# Patient Record
Sex: MALE | Race: WHITE | Employment: FULL TIME | URBAN - METROPOLITAN AREA
[De-identification: names, ages, dates, MRNs, and addresses within clinical notes are randomized per-mention and may not be internally consistent; named-entity substitution may affect disease eponyms.]

---

## 2019-01-14 PROBLEM — N13.2 HYDRONEPHROSIS WITH URINARY OBSTRUCTION DUE TO RENAL CALCULUS: Status: ACTIVE | Noted: 2019-01-14

## 2019-01-24 PROBLEM — R10.9 LEFT FLANK PAIN: Status: ACTIVE | Noted: 2019-01-24

## 2019-03-05 RX ORDER — METFORMIN HYDROCHLORIDE 500 MG/1
TABLET ORAL 2 TIMES DAILY WITH MEALS
COMMUNITY

## 2019-03-05 RX ORDER — ATORVASTATIN CALCIUM 20 MG/1
TABLET, FILM COATED ORAL DAILY
COMMUNITY

## 2019-03-05 RX ORDER — DEXTROAMPHETAMINE SACCHARATE, AMPHETAMINE ASPARTATE, DEXTROAMPHETAMINE SULFATE AND AMPHETAMINE SULFATE 5; 5; 5; 5 MG/1; MG/1; MG/1; MG/1
20 TABLET ORAL 2 TIMES DAILY
COMMUNITY

## 2019-03-05 RX ORDER — MODAFINIL 200 MG/1
200 TABLET ORAL DAILY
COMMUNITY

## 2019-03-11 ENCOUNTER — ANESTHESIA EVENT (OUTPATIENT)
Dept: SURGERY | Age: 59
DRG: 661 | End: 2019-03-11
Payer: OTHER GOVERNMENT

## 2019-03-11 RX ORDER — CEFAZOLIN SODIUM 2 G/50ML
2 SOLUTION INTRAVENOUS ONCE
Status: CANCELLED | OUTPATIENT
Start: 2019-03-12 | End: 2019-03-12

## 2019-03-12 ENCOUNTER — APPOINTMENT (OUTPATIENT)
Dept: GENERAL RADIOLOGY | Age: 59
DRG: 661 | End: 2019-03-12
Attending: UROLOGY
Payer: OTHER GOVERNMENT

## 2019-03-12 ENCOUNTER — HOSPITAL ENCOUNTER (INPATIENT)
Age: 59
LOS: 1 days | Discharge: HOME | DRG: 661 | End: 2019-03-13
Attending: UROLOGY | Admitting: UROLOGY
Payer: OTHER GOVERNMENT

## 2019-03-12 ENCOUNTER — ANESTHESIA (OUTPATIENT)
Dept: SURGERY | Age: 59
DRG: 661 | End: 2019-03-12
Payer: OTHER GOVERNMENT

## 2019-03-12 DIAGNOSIS — N20.0 CALCULUS OF LEFT KIDNEY: ICD-10-CM

## 2019-03-12 DIAGNOSIS — N13.2 HYDRONEPHROSIS WITH URINARY OBSTRUCTION DUE TO RENAL CALCULUS: ICD-10-CM

## 2019-03-12 DIAGNOSIS — R10.9 LEFT FLANK PAIN: Primary | ICD-10-CM

## 2019-03-12 LAB
ANION GAP SERPL CALC-SCNC: 6 MMOL/L (ref 3–18)
BUN SERPL-MCNC: 15 MG/DL (ref 7–18)
BUN/CREAT SERPL: 12 (ref 12–20)
CALCIUM SERPL-MCNC: 8.8 MG/DL (ref 8.5–10.1)
CHLORIDE SERPL-SCNC: 107 MMOL/L (ref 100–108)
CO2 SERPL-SCNC: 27 MMOL/L (ref 21–32)
CREAT SERPL-MCNC: 1.24 MG/DL (ref 0.6–1.3)
ERYTHROCYTE [DISTWIDTH] IN BLOOD BY AUTOMATED COUNT: 12.5 % (ref 11.6–14.5)
GLUCOSE BLD STRIP.AUTO-MCNC: 109 MG/DL (ref 70–110)
GLUCOSE BLD STRIP.AUTO-MCNC: 97 MG/DL (ref 70–110)
GLUCOSE SERPL-MCNC: 122 MG/DL (ref 74–99)
HCT VFR BLD AUTO: 47.5 % (ref 36–48)
HGB BLD-MCNC: 15.5 G/DL (ref 13–16)
INR BLD: 1 (ref 0.9–1.2)
MCH RBC QN AUTO: 30.4 PG (ref 24–34)
MCHC RBC AUTO-ENTMCNC: 32.6 G/DL (ref 31–37)
MCV RBC AUTO: 93.1 FL (ref 74–97)
PLATELET # BLD AUTO: 277 K/UL (ref 135–420)
PMV BLD AUTO: 10.9 FL (ref 9.2–11.8)
POTASSIUM SERPL-SCNC: 4.2 MMOL/L (ref 3.5–5.5)
RBC # BLD AUTO: 5.1 M/UL (ref 4.7–5.5)
SODIUM SERPL-SCNC: 140 MMOL/L (ref 136–145)
WBC # BLD AUTO: 9 K/UL (ref 4.6–13.2)

## 2019-03-12 PROCEDURE — 77010033678 HC OXYGEN DAILY: Performed by: UROLOGY

## 2019-03-12 PROCEDURE — 77030013079 HC BLNKT BAIR HGGR 3M -A: Performed by: NURSE ANESTHETIST, CERTIFIED REGISTERED

## 2019-03-12 PROCEDURE — 74011250636 HC RX REV CODE- 250/636: Performed by: NURSE ANESTHETIST, CERTIFIED REGISTERED

## 2019-03-12 PROCEDURE — 74011250636 HC RX REV CODE- 250/636

## 2019-03-12 PROCEDURE — 77030038846 HC SCPE URETSCP LITHOVUE DISP BSC -H: Performed by: UROLOGY

## 2019-03-12 PROCEDURE — 77030002933 HC SUT MCRYL J&J -A: Performed by: UROLOGY

## 2019-03-12 PROCEDURE — 77030032490 HC SLV COMPR SCD KNE COVD -B: Performed by: UROLOGY

## 2019-03-12 PROCEDURE — 74011250637 HC RX REV CODE- 250/637: Performed by: UROLOGY

## 2019-03-12 PROCEDURE — 77030020782 HC GWN BAIR PAWS FLX 3M -B: Performed by: UROLOGY

## 2019-03-12 PROCEDURE — C1769 GUIDE WIRE: HCPCS | Performed by: UROLOGY

## 2019-03-12 PROCEDURE — C1726 CATH, BAL DIL, NON-VASCULAR: HCPCS | Performed by: UROLOGY

## 2019-03-12 PROCEDURE — 74011000250 HC RX REV CODE- 250: Performed by: UROLOGY

## 2019-03-12 PROCEDURE — 0T778DZ DILATION OF LEFT URETER WITH INTRALUMINAL DEVICE, VIA NATURAL OR ARTIFICIAL OPENING ENDOSCOPIC: ICD-10-PCS | Performed by: UROLOGY

## 2019-03-12 PROCEDURE — 77030005518 HC CATH URETH FOL 2W BARD -B: Performed by: UROLOGY

## 2019-03-12 PROCEDURE — C1894 INTRO/SHEATH, NON-LASER: HCPCS | Performed by: UROLOGY

## 2019-03-12 PROCEDURE — 85610 PROTHROMBIN TIME: CPT

## 2019-03-12 PROCEDURE — 77030018836 HC SOL IRR NACL ICUM -A: Performed by: UROLOGY

## 2019-03-12 PROCEDURE — 82360 CALCULUS ASSAY QUANT: CPT

## 2019-03-12 PROCEDURE — 0TC43ZZ EXTIRPATION OF MATTER FROM LEFT KIDNEY PELVIS, PERCUTANEOUS APPROACH: ICD-10-PCS | Performed by: UROLOGY

## 2019-03-12 PROCEDURE — 74011636320 HC RX REV CODE- 636/320: Performed by: UROLOGY

## 2019-03-12 PROCEDURE — 77030026438 HC STYL ET INTUB CARD -A: Performed by: NURSE ANESTHETIST, CERTIFIED REGISTERED

## 2019-03-12 PROCEDURE — 77030019605: Performed by: UROLOGY

## 2019-03-12 PROCEDURE — C1758 CATHETER, URETERAL: HCPCS | Performed by: UROLOGY

## 2019-03-12 PROCEDURE — C2617 STENT, NON-COR, TEM W/O DEL: HCPCS | Performed by: UROLOGY

## 2019-03-12 PROCEDURE — 77030031139 HC SUT VCRL2 J&J -A: Performed by: UROLOGY

## 2019-03-12 PROCEDURE — BT1F1ZZ FLUOROSCOPY OF LEFT KIDNEY, URETER AND BLADDER USING LOW OSMOLAR CONTRAST: ICD-10-PCS | Performed by: UROLOGY

## 2019-03-12 PROCEDURE — 77030020268 HC MISC GENERAL SUPPLY: Performed by: UROLOGY

## 2019-03-12 PROCEDURE — 74011000250 HC RX REV CODE- 250

## 2019-03-12 PROCEDURE — 77030012961 HC IRR KT CYSTO/TUR ICUM -A: Performed by: UROLOGY

## 2019-03-12 PROCEDURE — 77030034850: Performed by: UROLOGY

## 2019-03-12 PROCEDURE — 74011250636 HC RX REV CODE- 250/636: Performed by: UROLOGY

## 2019-03-12 PROCEDURE — 76210000016 HC OR PH I REC 1 TO 1.5 HR: Performed by: UROLOGY

## 2019-03-12 PROCEDURE — 85027 COMPLETE CBC AUTOMATED: CPT

## 2019-03-12 PROCEDURE — 77030008683 HC TU ET CUF COVD -A: Performed by: NURSE ANESTHETIST, CERTIFIED REGISTERED

## 2019-03-12 PROCEDURE — 76060000035 HC ANESTHESIA 2 TO 2.5 HR: Performed by: UROLOGY

## 2019-03-12 PROCEDURE — C1785 PMKR, DUAL, RATE-RESP: HCPCS | Performed by: UROLOGY

## 2019-03-12 PROCEDURE — 74425 UROGRAPHY ANTEGRADE RS&I: CPT

## 2019-03-12 PROCEDURE — 80048 BASIC METABOLIC PNL TOTAL CA: CPT

## 2019-03-12 PROCEDURE — 77030005206: Performed by: UROLOGY

## 2019-03-12 PROCEDURE — 99218 HC RM OBSERVATION: CPT

## 2019-03-12 PROCEDURE — 74011000250 HC RX REV CODE- 250: Performed by: NURSE ANESTHETIST, CERTIFIED REGISTERED

## 2019-03-12 PROCEDURE — 74011000258 HC RX REV CODE- 258: Performed by: UROLOGY

## 2019-03-12 PROCEDURE — 77030019927 HC TBNG IRR CYSTO BAXT -A: Performed by: UROLOGY

## 2019-03-12 PROCEDURE — 82962 GLUCOSE BLOOD TEST: CPT

## 2019-03-12 PROCEDURE — 77030006974 HC BSKT URET RTVR BSC -C: Performed by: UROLOGY

## 2019-03-12 PROCEDURE — 77030010545: Performed by: UROLOGY

## 2019-03-12 PROCEDURE — 76010000171 HC OR TIME 2 TO 2.5 HR INTENSV-TIER 1: Performed by: UROLOGY

## 2019-03-12 DEVICE — STENT URET 7FR L26CM POLYMER BLEND PH FREE COAT GRADUAL: Type: IMPLANTABLE DEVICE | Site: URETER | Status: FUNCTIONAL

## 2019-03-12 RX ORDER — ONDANSETRON 2 MG/ML
INJECTION INTRAMUSCULAR; INTRAVENOUS AS NEEDED
Status: DISCONTINUED | OUTPATIENT
Start: 2019-03-12 | End: 2019-03-12 | Stop reason: HOSPADM

## 2019-03-12 RX ORDER — INSULIN LISPRO 100 [IU]/ML
INJECTION, SOLUTION INTRAVENOUS; SUBCUTANEOUS
Status: DISCONTINUED | OUTPATIENT
Start: 2019-03-12 | End: 2019-03-13 | Stop reason: HOSPADM

## 2019-03-12 RX ORDER — HYDROMORPHONE HYDROCHLORIDE 2 MG/ML
INJECTION, SOLUTION INTRAMUSCULAR; INTRAVENOUS; SUBCUTANEOUS AS NEEDED
Status: DISCONTINUED | OUTPATIENT
Start: 2019-03-12 | End: 2019-03-12 | Stop reason: HOSPADM

## 2019-03-12 RX ORDER — ZOLPIDEM TARTRATE 5 MG/1
5 TABLET ORAL
Status: DISCONTINUED | OUTPATIENT
Start: 2019-03-12 | End: 2019-03-13 | Stop reason: HOSPADM

## 2019-03-12 RX ORDER — ONDANSETRON 2 MG/ML
4 INJECTION INTRAMUSCULAR; INTRAVENOUS
Status: DISCONTINUED | OUTPATIENT
Start: 2019-03-12 | End: 2019-03-13 | Stop reason: HOSPADM

## 2019-03-12 RX ORDER — TAMSULOSIN HYDROCHLORIDE 0.4 MG/1
0.4 CAPSULE ORAL
Qty: 30 CAP | Refills: 3 | Status: SHIPPED | OUTPATIENT
Start: 2019-03-12 | End: 2019-10-09 | Stop reason: SDUPTHER

## 2019-03-12 RX ORDER — SODIUM CHLORIDE 0.9 % (FLUSH) 0.9 %
5-40 SYRINGE (ML) INJECTION AS NEEDED
Status: DISCONTINUED | OUTPATIENT
Start: 2019-03-12 | End: 2019-03-13 | Stop reason: HOSPADM

## 2019-03-12 RX ORDER — DEXTROSE 50 % IN WATER (D50W) INTRAVENOUS SYRINGE
25-50 AS NEEDED
Status: DISCONTINUED | OUTPATIENT
Start: 2019-03-12 | End: 2019-03-13 | Stop reason: HOSPADM

## 2019-03-12 RX ORDER — KETOROLAC TROMETHAMINE 15 MG/ML
15 INJECTION, SOLUTION INTRAMUSCULAR; INTRAVENOUS
Status: DISCONTINUED | OUTPATIENT
Start: 2019-03-12 | End: 2019-03-13 | Stop reason: HOSPADM

## 2019-03-12 RX ORDER — GLYCOPYRROLATE 0.2 MG/ML
INJECTION INTRAMUSCULAR; INTRAVENOUS AS NEEDED
Status: DISCONTINUED | OUTPATIENT
Start: 2019-03-12 | End: 2019-03-12 | Stop reason: HOSPADM

## 2019-03-12 RX ORDER — THERA TABS 400 MCG
1 TAB ORAL DAILY
Status: DISCONTINUED | OUTPATIENT
Start: 2019-03-12 | End: 2019-03-13 | Stop reason: HOSPADM

## 2019-03-12 RX ORDER — MORPHINE SULFATE 2 MG/ML
2 INJECTION, SOLUTION INTRAMUSCULAR; INTRAVENOUS
Status: DISCONTINUED | OUTPATIENT
Start: 2019-03-12 | End: 2019-03-12

## 2019-03-12 RX ORDER — EPHEDRINE SULFATE/0.9% NACL/PF 25 MG/5 ML
SYRINGE (ML) INTRAVENOUS AS NEEDED
Status: DISCONTINUED | OUTPATIENT
Start: 2019-03-12 | End: 2019-03-12 | Stop reason: HOSPADM

## 2019-03-12 RX ORDER — DIPHENHYDRAMINE HYDROCHLORIDE 50 MG/ML
12.5 INJECTION, SOLUTION INTRAMUSCULAR; INTRAVENOUS
Status: DISCONTINUED | OUTPATIENT
Start: 2019-03-12 | End: 2019-03-13 | Stop reason: HOSPADM

## 2019-03-12 RX ORDER — SODIUM CHLORIDE 9 MG/ML
75 INJECTION, SOLUTION INTRAVENOUS CONTINUOUS
Status: DISCONTINUED | OUTPATIENT
Start: 2019-03-12 | End: 2019-03-12 | Stop reason: HOSPADM

## 2019-03-12 RX ORDER — FENTANYL CITRATE 50 UG/ML
INJECTION, SOLUTION INTRAMUSCULAR; INTRAVENOUS AS NEEDED
Status: DISCONTINUED | OUTPATIENT
Start: 2019-03-12 | End: 2019-03-12 | Stop reason: HOSPADM

## 2019-03-12 RX ORDER — GABAPENTIN 300 MG/1
300 CAPSULE ORAL 3 TIMES DAILY
Status: DISCONTINUED | OUTPATIENT
Start: 2019-03-12 | End: 2019-03-13 | Stop reason: HOSPADM

## 2019-03-12 RX ORDER — PHENYLEPHRINE HCL IN 0.9% NACL 1 MG/10 ML
SYRINGE (ML) INTRAVENOUS AS NEEDED
Status: DISCONTINUED | OUTPATIENT
Start: 2019-03-12 | End: 2019-03-12 | Stop reason: HOSPADM

## 2019-03-12 RX ORDER — OXYCODONE AND ACETAMINOPHEN 5; 325 MG/1; MG/1
1-2 TABLET ORAL
Qty: 20 TAB | Refills: 0 | Status: SHIPPED | OUTPATIENT
Start: 2019-03-12 | End: 2019-03-19

## 2019-03-12 RX ORDER — EZETIMIBE 10 MG/1
10 TABLET ORAL DAILY
Status: DISCONTINUED | OUTPATIENT
Start: 2019-03-12 | End: 2019-03-13 | Stop reason: HOSPADM

## 2019-03-12 RX ORDER — ACETAMINOPHEN 325 MG/1
650 TABLET ORAL
Status: DISCONTINUED | OUTPATIENT
Start: 2019-03-12 | End: 2019-03-12

## 2019-03-12 RX ORDER — MIDAZOLAM HYDROCHLORIDE 1 MG/ML
INJECTION, SOLUTION INTRAMUSCULAR; INTRAVENOUS AS NEEDED
Status: DISCONTINUED | OUTPATIENT
Start: 2019-03-12 | End: 2019-03-12 | Stop reason: HOSPADM

## 2019-03-12 RX ORDER — MODAFINIL 100 MG/1
200 TABLET ORAL DAILY
Status: DISCONTINUED | OUTPATIENT
Start: 2019-03-12 | End: 2019-03-13 | Stop reason: HOSPADM

## 2019-03-12 RX ORDER — FENOFIBRATE 145 MG/1
145 TABLET, COATED ORAL DAILY
Status: DISCONTINUED | OUTPATIENT
Start: 2019-03-12 | End: 2019-03-13 | Stop reason: HOSPADM

## 2019-03-12 RX ORDER — SODIUM CHLORIDE 0.9 % (FLUSH) 0.9 %
5-40 SYRINGE (ML) INJECTION EVERY 8 HOURS
Status: DISCONTINUED | OUTPATIENT
Start: 2019-03-12 | End: 2019-03-13 | Stop reason: HOSPADM

## 2019-03-12 RX ORDER — MAGNESIUM SULFATE 100 %
16 CRYSTALS MISCELLANEOUS AS NEEDED
Status: DISCONTINUED | OUTPATIENT
Start: 2019-03-12 | End: 2019-03-13 | Stop reason: HOSPADM

## 2019-03-12 RX ORDER — NITROFURANTOIN 25; 75 MG/1; MG/1
100 CAPSULE ORAL 2 TIMES DAILY
Qty: 14 CAP | Refills: 0 | Status: SHIPPED | OUTPATIENT
Start: 2019-03-12 | End: 2019-03-19

## 2019-03-12 RX ORDER — ROCURONIUM BROMIDE 10 MG/ML
INJECTION, SOLUTION INTRAVENOUS AS NEEDED
Status: DISCONTINUED | OUTPATIENT
Start: 2019-03-12 | End: 2019-03-12 | Stop reason: HOSPADM

## 2019-03-12 RX ORDER — SUCCINYLCHOLINE CHLORIDE 20 MG/ML
INJECTION INTRAMUSCULAR; INTRAVENOUS AS NEEDED
Status: DISCONTINUED | OUTPATIENT
Start: 2019-03-12 | End: 2019-03-12 | Stop reason: HOSPADM

## 2019-03-12 RX ORDER — ATORVASTATIN CALCIUM 20 MG/1
20 TABLET, FILM COATED ORAL DAILY
Status: DISCONTINUED | OUTPATIENT
Start: 2019-03-12 | End: 2019-03-13 | Stop reason: HOSPADM

## 2019-03-12 RX ORDER — BUPIVACAINE HYDROCHLORIDE AND EPINEPHRINE 5; 5 MG/ML; UG/ML
INJECTION, SOLUTION EPIDURAL; INTRACAUDAL; PERINEURAL AS NEEDED
Status: DISCONTINUED | OUTPATIENT
Start: 2019-03-12 | End: 2019-03-12 | Stop reason: HOSPADM

## 2019-03-12 RX ORDER — CYCLOBENZAPRINE HCL 10 MG
10 TABLET ORAL
Status: DISCONTINUED | OUTPATIENT
Start: 2019-03-12 | End: 2019-03-13 | Stop reason: HOSPADM

## 2019-03-12 RX ORDER — SODIUM CHLORIDE, SODIUM LACTATE, POTASSIUM CHLORIDE, CALCIUM CHLORIDE 600; 310; 30; 20 MG/100ML; MG/100ML; MG/100ML; MG/100ML
125 INJECTION, SOLUTION INTRAVENOUS CONTINUOUS
Status: DISCONTINUED | OUTPATIENT
Start: 2019-03-12 | End: 2019-03-13

## 2019-03-12 RX ORDER — NALOXONE HYDROCHLORIDE 0.4 MG/ML
0.4 INJECTION, SOLUTION INTRAMUSCULAR; INTRAVENOUS; SUBCUTANEOUS AS NEEDED
Status: DISCONTINUED | OUTPATIENT
Start: 2019-03-12 | End: 2019-03-13 | Stop reason: HOSPADM

## 2019-03-12 RX ORDER — OXYCODONE HYDROCHLORIDE 5 MG/1
5 TABLET ORAL
Status: DISCONTINUED | OUTPATIENT
Start: 2019-03-12 | End: 2019-03-13 | Stop reason: HOSPADM

## 2019-03-12 RX ORDER — AMPICILLIN 2 G/1
2 INJECTION, POWDER, FOR SOLUTION INTRAVENOUS
Status: DISCONTINUED | OUTPATIENT
Start: 2019-03-12 | End: 2019-03-12

## 2019-03-12 RX ORDER — SODIUM CHLORIDE 0.9 % (FLUSH) 0.9 %
5-40 SYRINGE (ML) INJECTION EVERY 8 HOURS
Status: DISCONTINUED | OUTPATIENT
Start: 2019-03-12 | End: 2019-03-12 | Stop reason: HOSPADM

## 2019-03-12 RX ORDER — PROPOFOL 10 MG/ML
INJECTION, EMULSION INTRAVENOUS AS NEEDED
Status: DISCONTINUED | OUTPATIENT
Start: 2019-03-12 | End: 2019-03-12 | Stop reason: HOSPADM

## 2019-03-12 RX ORDER — OXYCODONE HYDROCHLORIDE 5 MG/1
5 TABLET ORAL
Status: DISCONTINUED | OUTPATIENT
Start: 2019-03-12 | End: 2019-03-12

## 2019-03-12 RX ORDER — NAPROXEN 250 MG/1
500 TABLET ORAL 2 TIMES DAILY WITH MEALS
Status: DISCONTINUED | OUTPATIENT
Start: 2019-03-12 | End: 2019-03-12

## 2019-03-12 RX ORDER — BUPIVACAINE HYDROCHLORIDE 5 MG/ML
INJECTION, SOLUTION EPIDURAL; INTRACAUDAL AS NEEDED
Status: DISCONTINUED | OUTPATIENT
Start: 2019-03-12 | End: 2019-03-12 | Stop reason: HOSPADM

## 2019-03-12 RX ORDER — TAMSULOSIN HYDROCHLORIDE 0.4 MG/1
0.4 CAPSULE ORAL
Status: DISCONTINUED | OUTPATIENT
Start: 2019-03-12 | End: 2019-03-13 | Stop reason: HOSPADM

## 2019-03-12 RX ORDER — DOCUSATE SODIUM 100 MG/1
100 CAPSULE, LIQUID FILLED ORAL 2 TIMES DAILY
Status: DISCONTINUED | OUTPATIENT
Start: 2019-03-12 | End: 2019-03-13 | Stop reason: HOSPADM

## 2019-03-12 RX ORDER — ATROPA BELLADONNA AND OPIUM 16.2; 3 MG/1; MG/1
1 SUPPOSITORY RECTAL
Status: DISCONTINUED | OUTPATIENT
Start: 2019-03-12 | End: 2019-03-13 | Stop reason: HOSPADM

## 2019-03-12 RX ORDER — LIDOCAINE HYDROCHLORIDE 20 MG/ML
INJECTION, SOLUTION EPIDURAL; INFILTRATION; INTRACAUDAL; PERINEURAL AS NEEDED
Status: DISCONTINUED | OUTPATIENT
Start: 2019-03-12 | End: 2019-03-12 | Stop reason: HOSPADM

## 2019-03-12 RX ORDER — GLUCOSAM/CHONDRO/HERB 149/HYAL 750-100 MG
1 TABLET ORAL DAILY
Status: DISCONTINUED | OUTPATIENT
Start: 2019-03-13 | End: 2019-03-13 | Stop reason: HOSPADM

## 2019-03-12 RX ORDER — MORPHINE SULFATE 2 MG/ML
2 INJECTION, SOLUTION INTRAMUSCULAR; INTRAVENOUS
Status: DISCONTINUED | OUTPATIENT
Start: 2019-03-12 | End: 2019-03-13 | Stop reason: HOSPADM

## 2019-03-12 RX ORDER — SODIUM CHLORIDE 0.9 % (FLUSH) 0.9 %
5-40 SYRINGE (ML) INJECTION AS NEEDED
Status: DISCONTINUED | OUTPATIENT
Start: 2019-03-12 | End: 2019-03-12 | Stop reason: HOSPADM

## 2019-03-12 RX ORDER — ACETAMINOPHEN 500 MG
1000 TABLET ORAL EVERY 6 HOURS
Status: DISCONTINUED | OUTPATIENT
Start: 2019-03-12 | End: 2019-03-13 | Stop reason: HOSPADM

## 2019-03-12 RX ORDER — NEOSTIGMINE METHYLSULFATE 5 MG/5 ML
SYRINGE (ML) INTRAVENOUS AS NEEDED
Status: DISCONTINUED | OUTPATIENT
Start: 2019-03-12 | End: 2019-03-12 | Stop reason: HOSPADM

## 2019-03-12 RX ADMIN — FENTANYL CITRATE 50 MCG: 50 INJECTION, SOLUTION INTRAMUSCULAR; INTRAVENOUS at 07:34

## 2019-03-12 RX ADMIN — ACETAMINOPHEN 1000 MG: 500 TABLET ORAL at 23:04

## 2019-03-12 RX ADMIN — HYDROMORPHONE HYDROCHLORIDE 1 MG: 2 INJECTION, SOLUTION INTRAMUSCULAR; INTRAVENOUS; SUBCUTANEOUS at 08:10

## 2019-03-12 RX ADMIN — SODIUM CHLORIDE 75 ML/HR: 900 INJECTION, SOLUTION INTRAVENOUS at 06:50

## 2019-03-12 RX ADMIN — SUCCINYLCHOLINE CHLORIDE 180 MG: 20 INJECTION INTRAMUSCULAR; INTRAVENOUS at 07:42

## 2019-03-12 RX ADMIN — EZETIMIBE 10 MG: 10 TABLET ORAL at 11:49

## 2019-03-12 RX ADMIN — DOCUSATE SODIUM 100 MG: 100 CAPSULE, LIQUID FILLED ORAL at 11:49

## 2019-03-12 RX ADMIN — OXYCODONE HYDROCHLORIDE 5 MG: 5 TABLET ORAL at 19:30

## 2019-03-12 RX ADMIN — ACETAMINOPHEN 1000 MG: 500 TABLET ORAL at 19:30

## 2019-03-12 RX ADMIN — ATROPA BELLADONNA AND OPIUM 1 SUPPOSITORY: 16.2; 3 SUPPOSITORY RECTAL at 10:07

## 2019-03-12 RX ADMIN — ROCURONIUM BROMIDE 10 MG: 10 INJECTION, SOLUTION INTRAVENOUS at 08:39

## 2019-03-12 RX ADMIN — THERA TABS 1 TABLET: TAB at 11:49

## 2019-03-12 RX ADMIN — Medication 10 ML: at 21:45

## 2019-03-12 RX ADMIN — GENTAMICIN SULFATE 307.6 MG: 40 INJECTION, SOLUTION INTRAMUSCULAR; INTRAVENOUS at 07:34

## 2019-03-12 RX ADMIN — Medication 100 MCG: at 08:18

## 2019-03-12 RX ADMIN — GABAPENTIN 300 MG: 300 CAPSULE ORAL at 21:37

## 2019-03-12 RX ADMIN — DOCUSATE SODIUM 100 MG: 100 CAPSULE, LIQUID FILLED ORAL at 18:07

## 2019-03-12 RX ADMIN — PROPOFOL 200 MG: 10 INJECTION, EMULSION INTRAVENOUS at 07:42

## 2019-03-12 RX ADMIN — MORPHINE SULFATE 2 MG: 2 INJECTION, SOLUTION INTRAMUSCULAR; INTRAVENOUS at 21:28

## 2019-03-12 RX ADMIN — Medication 10 MG: at 08:17

## 2019-03-12 RX ADMIN — NAPROXEN 500 MG: 250 TABLET ORAL at 16:57

## 2019-03-12 RX ADMIN — OXYCODONE HYDROCHLORIDE 5 MG: 5 TABLET ORAL at 15:06

## 2019-03-12 RX ADMIN — GLYCOPYRROLATE 0.2 MG: 0.2 INJECTION INTRAMUSCULAR; INTRAVENOUS at 08:07

## 2019-03-12 RX ADMIN — FENOFIBRATE 145 MG: 145 TABLET, FILM COATED ORAL at 18:07

## 2019-03-12 RX ADMIN — MORPHINE SULFATE 2 MG: 2 INJECTION, SOLUTION INTRAMUSCULAR; INTRAVENOUS at 12:53

## 2019-03-12 RX ADMIN — LIDOCAINE HYDROCHLORIDE 100 MG: 20 INJECTION, SOLUTION EPIDURAL; INFILTRATION; INTRACAUDAL; PERINEURAL at 07:42

## 2019-03-12 RX ADMIN — ROCURONIUM BROMIDE 45 MG: 10 INJECTION, SOLUTION INTRAVENOUS at 07:48

## 2019-03-12 RX ADMIN — FAMOTIDINE 20 MG: 10 INJECTION INTRAVENOUS at 06:48

## 2019-03-12 RX ADMIN — GABAPENTIN 300 MG: 300 CAPSULE ORAL at 16:58

## 2019-03-12 RX ADMIN — MORPHINE SULFATE 2 MG: 2 INJECTION, SOLUTION INTRAMUSCULAR; INTRAVENOUS at 16:58

## 2019-03-12 RX ADMIN — GLYCOPYRROLATE 0.6 MG: 0.2 INJECTION INTRAMUSCULAR; INTRAVENOUS at 09:07

## 2019-03-12 RX ADMIN — FENTANYL CITRATE 50 MCG: 50 INJECTION, SOLUTION INTRAMUSCULAR; INTRAVENOUS at 07:42

## 2019-03-12 RX ADMIN — SODIUM CHLORIDE, SODIUM LACTATE, POTASSIUM CHLORIDE, AND CALCIUM CHLORIDE 125 ML/HR: 600; 310; 30; 20 INJECTION, SOLUTION INTRAVENOUS at 18:04

## 2019-03-12 RX ADMIN — ONDANSETRON 4 MG: 2 INJECTION INTRAMUSCULAR; INTRAVENOUS at 08:07

## 2019-03-12 RX ADMIN — MODAFINIL 200 MG: 100 TABLET ORAL at 18:07

## 2019-03-12 RX ADMIN — OXYCODONE HYDROCHLORIDE 5 MG: 5 TABLET ORAL at 11:48

## 2019-03-12 RX ADMIN — Medication 3 MG: at 09:07

## 2019-03-12 RX ADMIN — ATORVASTATIN CALCIUM 20 MG: 20 TABLET, FILM COATED ORAL at 11:49

## 2019-03-12 RX ADMIN — MIDAZOLAM HYDROCHLORIDE 2 MG: 1 INJECTION, SOLUTION INTRAMUSCULAR; INTRAVENOUS at 07:34

## 2019-03-12 RX ADMIN — AMPICILLIN SODIUM 2 G: 2 INJECTION, POWDER, FOR SOLUTION INTRAVENOUS at 08:00

## 2019-03-12 RX ADMIN — ROCURONIUM BROMIDE 5 MG: 10 INJECTION, SOLUTION INTRAVENOUS at 07:42

## 2019-03-12 RX ADMIN — TAMSULOSIN HYDROCHLORIDE 0.4 MG: 0.4 CAPSULE ORAL at 11:49

## 2019-03-12 NOTE — ANESTHESIA POSTPROCEDURE EVALUATION
Procedure(s):  LEFT PERCUTANEOUS NEPHROSTOMY/PERCUTANEOUS NEPHROSTOLITHOTOMY/100W HOLMIUM/C-ARM.     Anesthesia Post Evaluation      Multimodal analgesia: multimodal analgesia used between 6 hours prior to anesthesia start to PACU discharge  Patient location during evaluation: bedside  Patient participation: complete - patient participated  Level of consciousness: awake  Pain management: adequate  Airway patency: patent  Anesthetic complications: no  Cardiovascular status: acceptable  Respiratory status: acceptable  Hydration status: acceptable  Post anesthesia nausea and vomiting:  controlled      Visit Vitals  /87   Pulse 74   Temp 37.1 °C (98.8 °F)   Resp 12   Ht 5' 5\" (1.651 m)   Wt 104.3 kg (230 lb)   SpO2 98%   BMI 38.27 kg/m²

## 2019-03-12 NOTE — OP NOTES
Operative Note      Patient: Magaly Chi. Sex: male             MRN: [id number]      YOB: 1960      Age:  63 y.o. Preoperative Diagnosis: Kidney stones [N20.0]    Postoperative Diagnosis:  Kidney stones [N20.0]    Surgeon: Sunitha Fletcher Surgeon: Agueda Gusman    Anesthesia:  General    Procedure:    1) Cystoscopy  2) left Percutaneous nephrolithotomy ( > 2 cm)  3)  Surgeon acquired left percutaneous renal access dilation  4)  left ureteroscopy  5)  left retrograde pyelogram  6) left ureteral stent placement  7) < 1 hour physician fluoroscopy imaging interpretation time    Operative Indication: This is a 63 y.o. male with a history of left urolithiasis. Their stone burden included 2.2 cm. After the risks, benefits, alternatives, and complications were discussed they present now for operative management. Of note: Dr. Dirk Crowley performed the percutaneous renal access and PCNL while I performed the cystoscopy, ureteroscopy, stent placement, and retrograde pyelogram.    Procedure in Detail: The patient was brought to the operative suite. Anesthesia was induced and preoperative antibiotics were administered. They were then placed in the split leg prone position and their genitalia and left flank was prepped and draped in the usual fashion. A surgical timeout was performed confirming the patient's name, date of birth, laterality, and antibiotics. All were in agreement. A cystoscope was passed into the patient's bladder. The urethra and bladder revealed no abnormalities. A sensor wire was passed into the left ureteral orifice and up to the kidney using fluoroscopic guidance. A dual lumen catheter was passed over the wire. A retrograde pyelogram was performed revealing normal collecting system with a 2 cm renal pelvis stone. A superstiff wire was then passed through the catheter and the catheter removed.  A 11/13 fr access sheath was then passed over the wire and up to the proximal ureter. The inner stylet and wire were removed. A flexible ureteroscope was passed into the proximal ureter. Thorough pyeloscopy was performed revealing the large renal pelvis stone. An appropriate calyx for percutaneous access was selected located at posterior lower pole. Percutaneous renal access was obtained targeting the tip of the ureteroscope using fluoroscopic guidance with an 18 g needle. A wire was passed through the needle and grapsed with the ureteroscope using a basket and externalized providing through and through access. A Compe catheter was passed over our wire and the wire was exchanged for a superstiff wire and the Compe catheter removed. The incision was widened to 1 cm. A 30 fr x 6 in balloon dilator was passed over our wire and positioned using fluoroscopic and direct endoscopic guidance with our ureteroscope. The balloon was inflated to 30 marylu and the sheath was positioned under direct vision. The dilator and ureteroscope were removed and the rigid nephroscope was placed. The renal pelvis stone was encountered. The Uretron was used to fragment and suction out the stone. A Perc N-Bartlett was used to remove large fragments    Flexible nephroscopy was performed to evaluate the remaining calyces. Residual stone was noted in the upper pole. These were basketed and removed. No further stones were encountered. A flexible ureteroscope was then passed up the ureter and the sheath was examined in a retrograde fashion. No stones were seen within the ureter and there was no evidence of ureteral perforation. A 7 fr x 26 cm stent was then placed in the standard fashion using fluoroscopic guidance. An 18 fr abrams catheter was placed with 10 cc of water in the balloon. The renal access sheath was removed and the safety wire was removed. The skin was closed using 3-0 vicryl suture.      The patient was then awoken and transferred to the recovery room in stable condition. Estimated Blood Loss: 50 ml                   Implants:   Implant Name Type Inv. Item Serial No.  Lot No. LRB No. Used Action   STENT INLAY 6X26 -- CONVERT TO ITEM [id number] - [id number]  STENT INLAY 6X26 -- CONVERT TO ITEM [id number]  [employer] [id number] 1 Implanted       Specimens:   ID Type Source Tests Collected by Time Destination   1 : Kidney Stone analysis Fresh Kidney  Sumaya Georges MD 3/12/2019  8:38 AM Pathology        Drains: None           Complications:  None           Counts: Sponge and needle counts were correct times two.     Marine Walsh MD  3/12/2019

## 2019-03-12 NOTE — ANESTHESIA PREPROCEDURE EVALUATION
Anesthetic History               Review of Systems / Medical History  Patient summary reviewed and pertinent labs reviewed    Pulmonary        Sleep apnea: CPAP           Neuro/Psych   Within defined limits           Cardiovascular    Hypertension                   GI/Hepatic/Renal         Renal disease: stones       Endo/Other      Hypothyroidism       Other Findings   Comments: Obesity with SANDIE/CPAP           Physical Exam    Airway  Mallampati: III  TM Distance: 4 - 6 cm  Neck ROM: normal range of motion   Mouth opening: Normal     Cardiovascular    Rhythm: regular  Rate: normal         Dental  No notable dental hx       Pulmonary  Breath sounds clear to auscultation               Abdominal  GI exam deferred       Other Findings            Anesthetic Plan    ASA: 3  Anesthesia type: general          Induction: Intravenous  Anesthetic plan and risks discussed with: Patient      Borderline airway  Glidescope available

## 2019-03-12 NOTE — DISCHARGE INSTRUCTIONS
Discharge Instructions      Patient: Linda Koyanagi. Sex: male          DOA: 3/12/2019         YOB: 1960      Age:  63 y.o.        LOS:  LOS: 1 day     ACUTE DIAGNOSES:  Nephrolithiasis     CHRONIC MEDICAL DIAGNOSES:  Problem List as of 3/12/2019 Date Reviewed: 7/15/2013          Codes Class Noted - Resolved    Left flank pain ICD-10-CM: R10.9  ICD-9-CM: 789.09  1/24/2019 - Present        Hydronephrosis with urinary obstruction due to renal calculus, mild ICD-10-CM: N13.2  ICD-9-CM: 509, 592.0  1/14/2019 - Present        Enlarged prostate without lower urinary tract symptoms (luts) ICD-10-CM: N40.0  ICD-9-CM: 600.00  6/17/2013 - Present        Calculus of left kidney ICD-10-CM: N20.0  ICD-9-CM: 592.0  6/16/2013 - Present        RESOLVED: Calculus of ureter ICD-10-CM: N20.1  ICD-9-CM: 592.1  6/16/2013 - 1/14/2019        Active Stone Former ICD-10-CM: N39.9  ICD-9-CM: V47.4  6/16/2013 - Present        Nephrolithiasis ICD-10-CM: N20.0  ICD-9-CM: 592.0  3/12/2019 - Present              ACTIVITY: No heavy lifting for 1 week or until urine is clear, whichever is longer. ADDITIONAL INFORMATION:   You have indwelling ureteral stents which frequently causes slight discomfort in the flank region and bladder spasms. You can take flomax as needed for flank discomfort or Ditropan for bladder spasms. The indwelling stent will need to be removed/exchanged as directed below. If the stent is left in place without appropriate follow-up, it may become encrusted with stone and/or infected. If that occurs, you will require multiple additional surgeries to fix this. It is very important you follow up for appropriate removal or exchange of ureteral stents. If you experience any fevers > 100.4, significant nausea/vomiting, or significant worsening of pain, please contact us at the number below.   It is common to experience mild, recurrent blood in your urine and this is likely due to stent irritation. The general trend should be decreasing bleeding with occasional flares due to increased activity. If the bleeding continues to worsen with passage of clots, you are unable to urinate, or you experience significant light-headedness, please contact us at the number below. If you develop new bleeding after a period of clear urine and this is severe, particularly if this is around 10 days after surgery, please contact our office immediately. If the bleeding is severe with clots or you feel lightheaded, please proceed immediately to the closest Emergency department. You may resume showering but do not directly scrub the incision. You may resume bathing in two week. Please inspect your incision daily, looking for signs of infection (increasing/spreading redness, swelling, drainage). Keep the incision clean and dry. Should urine leakage persist beyond one week, please contact [facility]. FOLLOW UP CARE:  You have a return in appointment in about 1 -2 weeks with a cystoscopy and stent removal in the office.

## 2019-03-12 NOTE — H&P
H&P    Patient: Hernando Oates. Sex: male          DOA: 3/12/2019       YOB: 1960      Age:  63 y.o.                 ASSESSMENT:   1. Left 2.5cm renal pelvic stone   2. SANDIE       PLAN:    To OR for Left PCNL     Beth Asif MD  [phone] Office  [phone]  Pager    Chief Complaint: Kidneys tone     HISTORY OF PRESENT ILLNESS:  Hernando Oates. is a 63 y.o. male with 2.5cm left renal stone here today for PCNL. No interval changes in his health. Denies LUTS or hematuria. AUA Symptom Score 2/4/2019   Over the past month how often have you had the sensation that your bladder was not completely empty after you finished urinating? 0   Over the past month, how often have had to urinate again less than 2 hours after you last finished urinating? 2   Over the past month, how often have you found you stopped and started again several times when you urinated? 0   Over the past month, how often have you found it difficult to postpone urination? 5   Over the past month, how often have you had a weak urinary stream? 0   Over the past month, how often have you had to push or strain to begin urinating? 0   Over the past month, how many times did you most typically get up to urinate from the time you went to bed at night until the time you got up in the morning? 1   AUA Score 8   If you were to spend the rest of your life with your urinary condition the way it is now, how would you feel about that?  Mostly satisfied       Past Medical History:   Diagnosis Date    Acquired hypothyroidism     Essential hypertension     Kidney stone     Sleep apnea     uses cpap       Past Surgical History:   Procedure Laterality Date    HX BACK SURGERY  1997    HX LITHOTRIPSY  1990    HX ORTHOPAEDIC      HX UROLOGICAL      NEUROLOGICAL PROCEDURE UNLISTED         Social History     Tobacco Use    Smoking status: Never Smoker    Smokeless tobacco: Never Used Substance Use Topics    Alcohol use: Not on file    Drug use: No       No Known Allergies    History reviewed. No pertinent family history. Current Facility-Administered Medications   Medication Dose Route Frequency Provider Last Rate Last Dose    sodium chloride (NS) flush 5-40 mL  5-40 mL IntraVENous Q8H Hux, Emilia L, CRNA        sodium chloride (NS) flush 5-40 mL  5-40 mL IntraVENous PRN Hux, Emilia L, CRNA        0.9% sodium chloride infusion  75 mL/hr IntraVENous CONTINUOUS Hux, Emilia L, CRNA 75 mL/hr at 03/12/19 0650 75 mL/hr at 03/12/19 0650    gentamicin (GARAMYCIN) 307.6 mg in 0.9% sodium chloride 100 mL IVPB  5 mg/kg (Ideal) IntraVENous ON CALL TO OR Jorge Alberto Khalil MD        ampicillin (OMNIPEN) 2 g in 0.9% sodium chloride (MBP/ADV) 100 mL MBP  2 g IntraVENous ON CALL TO OR Jorge Alberto Khalil MD           Review of Systems  Constitutional: No fever, chills, or weight loss  Respiratory: No dyspnea  Cardiovascular: No chest pain  Gastrointestinal: No vomiting or abdominal pain. Genitourinary: Denies frequency, urgency, dysuria, hematuria. Neurological: No focal motor changes. PHYSICAL EXAMINATION:   Visit Vitals  /80   Pulse 67   Temp 98.1 °F (36.7 °C)   Resp 20   Ht 5' 5\" (1.651 m)   Wt 230 lb (104.3 kg)   SpO2 96%   BMI 38.27 kg/m²     Constitutional: Well developed, well nourished male. No acute distress. HEENT: Normocephalic, Atraumatic, EOM's intact   CV:  Normal radial pulse. Respiratory: No respiratory distress or difficulties breathing   Abdomen:  Nontender, nondistended.  Male:  No CVA tenderness  Skin: No evidence of jaundice. Normal color  Neuro/Psych:  Alert and oriented. Affect appropriate. Lymphatic:   No enlarged inguinal lymph nodes. REVIEW OF LABS AND IMAGING:      Labs: Results:   Chemistry  No results for input(s): GLU, NA, K, CL, CO2, BUN, CREA, CA, AGAP, BUCR, TBIL, GPT, AP, TP, ALB, GLOB, AGRAT in the last 72 hours.    CBC w/Diff Recent Labs 03/12/19  0630   WBC 9.0   RBC 5.10   HGB 15.5   HCT 47.5         Cultures No results for input(s): CULT in the last 72 hours. All Micro Results     None            Urinalysis No results found for: COLOR, APPRN, SPGRU, REFSG, SID, PROTU, KETU, BILU, BLDU, UROU, TU, LEUKU, GLUKE, ATCAL, ATCL2, ATCL3, ATCL4, K, CREA, BUN, PSA   PSA No results for input(s): PSA in the last 72 hours. Coagulation Lab Results   Component Value Date/Time    INR (POC) 1.0 03/12/2019 07:10 AM           US Results (most recent):  No results found for this or any previous visit. chest    CT Results (most recent):   Results from Orders Only encounter on 06/18/13   CT ABD PELV WO CONT    Impression Procedure Date Procedure Time     6/17/2013  7:02 PM             Impression   Impression:        1.  8 mm stone in the left renal pelvis with minimal left  hydronephrosis. 2.  Nonobstructing right renal calculi. 3.  Colonic diverticulosis without evidence of acute  diverticulitis. 4.  Hepatic steatosis. Narrative   Indication: Bilateral flank pain. Technique:  Multiple axial CT images were obtained from the lung  bases to the pubic symphysis without the use of oral or IV  contrast material.  Coronal reconstructions were generated and  reviewed. This study was performed to accentuate any pathologic  calcifications especially in the kidneys and collecting systems. This study is limited for the evaluation of the other  intra-abdominal organs and bowel. The DLP for this study was  1697.12 mGy-cm. Comparison: K[facility], 6/6/2013. Abdominal CT:       No abnormalities are identified in the lung bases. The heart  size is within normal limits. There is a small sliding hiatal  hernia. An 8 mm stone is present in the left renal pelvis with mild  adjacent fat stranding. There is minimal left hydronephrosis. A  couple punctate nonobstructing calculi are present in the left  kidney.        There is diffuse low-attenuation of the liver. Focal fat sparing  is present adjacent to the gallbladder. The unenhanced spleen, gallbladder, pancreas, and adrenal glands  are unremarkable. There is no free air or free fluid. Colonic diverticula are present. There is no evidence of acute  diverticulitis. The appendix is normal.       Interbody spacers are present at L5-S1. Pelvis CT:       No pelvic mass or lymphadenopathy are identified. The urinary  bladder has a normal appearance. The prostate gland is within  normal limits. No definite free air or free fluid is identified.           Dictated by: Wanda Brown on Mon Jun 17, 2013  7:02:28 PM  EDT                       Signed by     Signed    Date/Time    Phone Pager     Wanda Brown MD 6/17/2013 19:11 [phone]             [facility] [225]         Exam Information     Status Exam Begun    Exam Ended        Final [99] 6/17/2013 18:34 6/17/2013 18:48           PACS Images     Show images for CT ABDOMEN/PELVIS W/O CONTRAST                     External Result Report     External Result Report               RESULTING LAB       Lab     OTHER           Priority and Order Details       Priority Class        Routine Other Facility          Order    CT ABDOMEN/PELVIS W/O CONTRAST [[id number]] (Order [id number])             Order Providers       Authorizing Encounter Billing     Shaun Meneses Loman Dowse           Department       Name              PW II: Radiology                 Provider Information:       Ordering User Authorizing Provider     Fahad Weber MD     Attending Provider(s) PCP Billing Provider     MD Nai Gray MD Orvilla Dirk, MD                   Collection Information       Collection Date Collection Time     6/17/2013  7:02 PM             RESULTING LAB       Lab     OTHER                   Associated Diagnoses       Calculus of kidney [592.0]            Calculus of ureter [592.1]                  Scheduling Instructions       DO NOT ADD COMMENTS TO RADIOLOGY HERE. USE COMMENTS BOX ABOVE. Preferred location(s):                           Original Order       Ordered On Ordered By        Mon Jun 17, 2013  6:33 PM Oscar Souza                 Release Information       Released On Released By     Mon Jun 17, 2013  6:34 PM Oscar Souza             Additional Information       Associated Reports     View Encounter     Priority and Order Details             Order Information       CT ABDOMEN/PELVIS W/O CONTRAST (Order #[id number]) on 6/17/13           ABN Associated with this Order       No ABN is associated with this order. ABD      MRI Results (most recent): No procedure found. No diagnosis found.

## 2019-03-12 NOTE — PERIOP NOTES
TRANSFER - OUT REPORT:    Verbal report given to Dallas Aquino Banner.  being transferred to  for routine post - op       Report consisted of patients Situation, Background, Assessment and   Recommendations(SBAR). Information from the following report(s) SBAR and MAR was reviewed with the receiving nurse. Lines:   Peripheral IV 03/12/19 Right Hand (Active)   Site Assessment Clean, dry, & intact 3/12/2019  9:36 AM   Phlebitis Assessment 0 3/12/2019  9:36 AM   Infiltration Assessment 0 3/12/2019  9:36 AM   Dressing Status Clean, dry, & intact 3/12/2019  9:36 AM   Dressing Type Tape;Transparent 3/12/2019  9:36 AM   Hub Color/Line Status Red 3/12/2019  9:36 AM   Alcohol Cap Used No 3/12/2019  6:55 AM        Opportunity for questions and clarification was provided.       Patient transported with:   O2 @ 2 liters

## 2019-03-12 NOTE — PROGRESS NOTES
conducted an initial consultation and Spiritual Assessment for Cheri Patricia, who is a 63 y. o.,male. Patients Primary Language is: [place]. According to the patients EMR Tenriism Affiliation is: Catholic.     The reason the Patient came to the hospital is:   Patient Active Problem List    Diagnosis Date Noted    Left flank pain 01/24/2019     Priority: 1 - One    Hydronephrosis with urinary obstruction due to renal calculus, mild 01/14/2019     Priority: 1 - One    Enlarged prostate without lower urinary tract symptoms (luts) 06/17/2013     Priority: 1 - One    Calculus of left kidney 06/16/2013     Priority: 1 - One    Active Stone Former 06/16/2013     Priority: 3 - Three    Nephrolithiasis 03/12/2019        The  provided the following Interventions:  Initiated a relationship of care and support. Listened empathically. Provided information about Spiritual Care Services. Offered prayer and assurance of continued prayers on patient's behalf. Chart reviewed. The following outcomes where achieved:   confirmed Patient's Tenriism Affiliation. Patient expressed gratitude for 's visit. Assessment:  Patient does not have any Holiness/cultural needs that will affect patients preferences in health care. There are no spiritual or Holiness issues which require intervention at this time. Plan:  Chaplains will continue to follow and will provide pastoral care on an as needed/requested basis.     [address]  [phone])

## 2019-03-12 NOTE — OP NOTES
Operative Note  Patient: Moses Ware. Sex: male             MRN: [id number]  YOB: 1960      Age:  63 y.o. Preoperative Diagnosis: Kidney stones [N20.0]  Postoperative Diagnosis:  Kidney stones [N20.0]  Surgeon: Dougie Cooper   Cosurgeon:  Jay Keenan    Indication: This is a 62 y/o man with 2.2cm left renal pelvic stone here today for PCNL. Preop urine culture negative. Clearance obtained. Procedure:    1) Cystoscopy  2) Flexible ureteroscopy    3) Surgeon acquired percutaneous renal access   4) Percutaneous nephrolithotomy   5) Ureteral stent placement     Findings:    1). Normal cystoscopy  2)  Ureteroscopy revealed large renal pelvic stone, edematous tissue  3). Left Percutaneous renal acces obtained via triangulation technique, lower pole    4). Total stone burden 2.2cm, visually completely cleared   5). 11x27 JJ stent placed    I performed the Perc access and PCNL. Dr. Carol Rojas performed the rest.     Narrative of events: The patient was brought to the operative suite. Anesthesia was induced and preoperative antibiotics were administered. The were prepped in split leg prone position with access to the urethra and to the flank. After appropriate pause  and confirmation of antibiotic administration, cystoscopy was performed. Prostate was non obstructing . Bladder was normal.  Left ureteral orifice was identified and cannulated with a 0.035 sensor wire. A duel lumen was advanced and retrogarde pyelogram performed revealing no hydronephrosis. Second wire was advanced and an access sheath was advanced to the proximal ureter. Flexible ureteroscopy was then performed. This revealed friable mucosa and large jagged stone in renal pelvis. A lower pole posterior calyx was identified that would be adequate for access. Triangulation technique was utilized to enter the calyx. This was observed both fluoroscopically and endoscopically.   A wire was advanced and grasped with a basket with the flexible ureteroscopy and brought out of the urethra thus obtaining through and through access. A tigertail catheter was advanced over the wire all the way into the bladder and out the urethra to then exchange the wire for a super stiff. The balloon was advanced over the super stiff and confirmed just at the point of entry into the calyx endoscopically. The balloon was inflated and a 30 fr sheath was advanced over the balloon which was removed and rigid nephroscope was advanced. The ultrasonic lithotriptor was used to break up all the stone and remove it. Karissa Traore was sent for analysis. Flexible nephroscopy was then performed to ensure no residual fragments. Retrograde flexible ureteroscopy was then performed to clear the ureter. A JJ stent was then advanced over the wire and deployed under direct fluoroscopic guidance in the renal pelvis and in the bladder. The sheath was removed and there was minimal bleeding from the puncture site. It was closed with interrupted vycril and running monocryl. Patient was awoken from anesthesia and tolerated the procedure well. He was transferred to the recovery room in stable condition. Estimated Blood Loss: 50 CC     Anesthesia:  General                  Implants:   Implant Name Type Inv. Item Serial No.  Lot No. LRB No. Used Action   STENT INLAY 6X26 -- CONVERT TO ITEM [id number] - [id number]  STENT INLAY 6X26 -- CONVERT TO ITEM [id number]  [employer] [id number] 1 Implanted       Specimens:   ID Type Source Tests Collected by Time Destination   1 : Kidney Stone analysis Fresh Kidney  Dania Klein MD 3/12/2019  8:38 AM Pathology        Complications:  None    Plan:  1. Overnight observation  2. [place] out prior to discharge   3. Return in 2 weeks for cysto/stent removal in the office   4. Macrobid x 2 weeks while stent is in   5.  Return in 2 months with KUB/Ultrasound [facility], MD  3/12/2019

## 2019-03-12 NOTE — ROUTINE PROCESS
Bedside and Verbal shift change report given to IVAN Bryan (oncoming nurse) by Niesha Simpson (offgoing nurse). Report included the following information SBAR, Kardex, Intake/Output, MAR and Recent Results.

## 2019-03-12 NOTE — PROGRESS NOTES
Reason for Admission:  Kidney stones [N20.0]  Nephrolithiasis [N20.0]                 RRAT Score:   0            Plan for utilizing home health:    unlikely                      Likelihood of Readmission:   LOW                         Transition of Care Plan:              Initial assessment completed with patient. Cognitive status of patient: oriented to time, place, person and situation. Face sheet information confirmed:  yes. The patient designates Nano, spouse, to participate in his/her discharge plan and to receive any needed information. This patient lives in a single family home with patient and wife. Patient is able to navigate steps as needed. Prior to hospitalization, patient was considered to be independent with ADLs/IADLS : yes . Patient has a current ACP document on file: no  The patient and wife will be available to transport patient home upon discharge. The patient already has none reported CPAP medical equipment available in the home. Patient is not currently active with home health. Patient has not stayed in a skilled nursing facility or rehab. This patient is on dialysis :no       Freedom of choice signed: no. Currently, the discharge plan is Home. The patient states that he can obtain his medications from the pharmacy, and take his medications as directed. Patient's current insurance is [employer]       Care Management Interventions  PCP Verified by CM: Yes  Mode of Transport at Discharge: [address] (CM Consult): Discharge Planning  Discharge Durable Medical Equipment: No  Occupational Therapy Consult: No  Speech Therapy Consult: No  Current Support Network: Lives with Spouse  Confirm Follow Up Transport: Family  Plan discussed with Pt/Family/Caregiver: Yes  Discharge Location  Discharge Placement: Home        BILLIE Scott, [address]

## 2019-03-13 VITALS
RESPIRATION RATE: 16 BRPM | BODY MASS INDEX: 38.32 KG/M2 | OXYGEN SATURATION: 94 % | HEIGHT: 65 IN | SYSTOLIC BLOOD PRESSURE: 118 MMHG | HEART RATE: 60 BPM | TEMPERATURE: 98.7 F | DIASTOLIC BLOOD PRESSURE: 75 MMHG | WEIGHT: 230 LBS

## 2019-03-13 LAB
ANION GAP SERPL CALC-SCNC: 4 MMOL/L (ref 3–18)
BUN SERPL-MCNC: 19 MG/DL (ref 7–18)
BUN/CREAT SERPL: 14 (ref 12–20)
CALCIUM SERPL-MCNC: 8.2 MG/DL (ref 8.5–10.1)
CHLORIDE SERPL-SCNC: 105 MMOL/L (ref 100–108)
CO2 SERPL-SCNC: 29 MMOL/L (ref 21–32)
CREAT SERPL-MCNC: 1.4 MG/DL (ref 0.6–1.3)
GLUCOSE SERPL-MCNC: 110 MG/DL (ref 74–99)
HCT VFR BLD AUTO: 39.7 % (ref 36–48)
HGB BLD-MCNC: 12.6 G/DL (ref 13–16)
POTASSIUM SERPL-SCNC: 3.7 MMOL/L (ref 3.5–5.5)
SODIUM SERPL-SCNC: 138 MMOL/L (ref 136–145)

## 2019-03-13 PROCEDURE — 74011250636 HC RX REV CODE- 250/636: Performed by: UROLOGY

## 2019-03-13 PROCEDURE — 85018 HEMOGLOBIN: CPT

## 2019-03-13 PROCEDURE — 65270000029 HC RM PRIVATE

## 2019-03-13 PROCEDURE — 74011250637 HC RX REV CODE- 250/637: Performed by: UROLOGY

## 2019-03-13 PROCEDURE — 36415 COLL VENOUS BLD VENIPUNCTURE: CPT

## 2019-03-13 PROCEDURE — 80048 BASIC METABOLIC PNL TOTAL CA: CPT

## 2019-03-13 PROCEDURE — 99218 HC RM OBSERVATION: CPT

## 2019-03-13 PROCEDURE — 77030038269 HC DRN EXT URIN PURWCK BARD -A

## 2019-03-13 RX ORDER — DOCUSATE SODIUM 100 MG/1
100 CAPSULE, LIQUID FILLED ORAL
Qty: 60 CAP | Refills: 2 | Status: SHIPPED | OUTPATIENT
Start: 2019-03-13 | End: 2019-06-11

## 2019-03-13 RX ADMIN — GABAPENTIN 300 MG: 300 CAPSULE ORAL at 08:16

## 2019-03-13 RX ADMIN — SODIUM CHLORIDE, SODIUM LACTATE, POTASSIUM CHLORIDE, AND CALCIUM CHLORIDE 125 ML/HR: 600; 310; 30; 20 INJECTION, SOLUTION INTRAVENOUS at 01:58

## 2019-03-13 RX ADMIN — DOCUSATE SODIUM 100 MG: 100 CAPSULE, LIQUID FILLED ORAL at 08:16

## 2019-03-13 RX ADMIN — OXYCODONE HYDROCHLORIDE 5 MG: 5 TABLET ORAL at 13:22

## 2019-03-13 RX ADMIN — FENOFIBRATE 145 MG: 145 TABLET, FILM COATED ORAL at 08:16

## 2019-03-13 RX ADMIN — Medication 10 ML: at 08:20

## 2019-03-13 RX ADMIN — KETOROLAC TROMETHAMINE 15 MG: 15 INJECTION, SOLUTION INTRAMUSCULAR; INTRAVENOUS at 01:54

## 2019-03-13 RX ADMIN — ACETAMINOPHEN 1000 MG: 500 TABLET ORAL at 06:43

## 2019-03-13 RX ADMIN — ACETAMINOPHEN 1000 MG: 500 TABLET ORAL at 13:22

## 2019-03-13 RX ADMIN — MORPHINE SULFATE 2 MG: 2 INJECTION, SOLUTION INTRAMUSCULAR; INTRAVENOUS at 04:33

## 2019-03-13 RX ADMIN — MODAFINIL 200 MG: 100 TABLET ORAL at 08:16

## 2019-03-13 RX ADMIN — THERA TABS 1 TABLET: TAB at 08:16

## 2019-03-13 RX ADMIN — TAMSULOSIN HYDROCHLORIDE 0.4 MG: 0.4 CAPSULE ORAL at 08:16

## 2019-03-13 RX ADMIN — ATORVASTATIN CALCIUM 20 MG: 20 TABLET, FILM COATED ORAL at 08:16

## 2019-03-13 RX ADMIN — OMEGA-3 FATTY ACIDS CAP 1000 MG 1 CAPSULE: 1000 CAP at 08:16

## 2019-03-13 RX ADMIN — LEVOTHYROXINE SODIUM 175 MCG: 150 TABLET ORAL at 08:16

## 2019-03-13 RX ADMIN — EZETIMIBE 10 MG: 10 TABLET ORAL at 08:16

## 2019-03-13 NOTE — PROGRESS NOTES
Discharge planning    Discharge order noted for today. Met with patient and spouse and are agreeable to the transition plan today. No needs noted from CM. Transport has been arranged with spouse . Updated bedside RN, Casie Merritt,  to the transition plan.    Discharge information has been documented on the AVS.       LACHO BrowneN, RN  Pager # [phone]  Care Manager

## 2019-03-13 NOTE — PROGRESS NOTES
Remove abrams at 1100 am. Pt voids at 1245pm 50 ml and PVR 31ml.  Pt voice some burning upon voiding Contact MD voice concerns of pt

## 2019-03-13 NOTE — ROUTINE PROCESS
2128 Patient complaining 7/10 incisional pain, medicated with morphine 2 mg IV. No respiratory distress noted. Incisional dressing to left flank dry and clean. Whitlock cath patent draining red bloody urine, few clots noted. Patient encouraged to drink plenty . No acute distress noted. 0030 Patient resting quietly , wearing C-Pap.  0430 Patient ambulated on the hallway, tolerated well. Back in bed medicated with morphine 2mg iv for pain.

## 2019-03-13 NOTE — PROGRESS NOTES
Problem: Falls - Risk of  Goal: *Absence of Falls  Document Meka Fall Risk and appropriate interventions in the flowsheet.   Outcome: Progressing Towards Goal  Fall Risk Interventions:  Mobility Interventions: Patient to call before getting OOB         Medication Interventions: Patient to call before getting OOB

## 2019-03-13 NOTE — ROUTINE PROCESS
Bedside and Verbal shift change report given to Quinn Tavarez 62 (oncoming nurse) by Joel Chadwick RN (offgoing nurse). Report included the following information SBAR, Kardex, Procedure Summary, Intake/Output, MAR and Recent Results.

## 2019-03-13 NOTE — DISCHARGE SUMMARY
Discharge Summary     Patient ID:  Quirino Young  [id number]  63 y.o.  1960    Admit Date: 3/12/2019    Discharge Date: 3/13/2019    * Admission Diagnoses: Kidney stones [N20.0]  Nephrolithiasis [N20.0]    * Discharge Diagnoses:    Hospital Problems as of 3/13/2019 Date Reviewed: 7/15/2013          Codes Class Noted - Resolved POA    Nephrolithiasis ICD-10-CM: N20.0  ICD-9-CM: 592.0  3/12/2019 - Present Unknown               Admission Condition: Stable    * Discharge Condition: improved    * Procedures: Procedure(s):  LEFT PERCUTANEOUS NEPHROSTOMY/PERCUTANEOUS NEPHROSTOLITHOTOMY/100W HOLMIUM/C-ARM    * Hospital Course:   Normal hospital course for this procedure. Overnight Observation. Repeat labs stable. VT this am. Home this afternoon. Abx x 2 weeks. Return Precautions reviewed with patient and family. Physical Examination:  Visit Vitals  /69 (BP 1 Location: Left arm, BP Patient Position: At rest)   Pulse 62   Temp 97.9 °F (36.6 °C)   Resp 14   Ht 5' 5\" (1.651 m)   Wt 230 lb (104.3 kg)   SpO2 91%   BMI 38.27 kg/m²      Constitutional: WDWN, Pleasant and appropriate affect, No acute distress. Head: Normocephalic and atraumatic. Eyes: No discharge. No scleral icterus. Neck: Normal range of motion. Neck supple. No JVD present. No tracheal deviation present. Pulmonary/Chest: Effort normal. No respiratory distress.    ABD: Obese, soft, non-tender, non-distended  Musc:normal gait and station  Psych: normal affect and mood, well groomed, appropriate answers, A&Ox3  Skin: Left Flank incision CDI, minimal drainage on dressing, now open to air   Male:   No CVA Tenderness   Scrotum:   normal   Epididymides: nontender, symmetric    Testes:   Nontender, symmetric, no masses   Urethral Meatus:   No stenosis   Penis:  normal   Foreskin:  circumcised       Consults: None    Significant Diagnostic Studies:       Labs: Results:   Chemistry    Recent Labs     03/13/19  0436 03/12/19  258 * 122*    140   K 3.7 4.2    107   CO2 29 27   BUN 19* 15   CREA 1.40* 1.24   CA 8.2* 8.8   AGAP 4 6   BUCR 14 12      CBC w/Diff Recent Labs     03/13/19  0436 03/12/19  0630   WBC  --  9.0   RBC  --  5.10   HGB 12.6* 15.5   HCT 39.7 47.5   PLT  --  277      Cultures No results for input(s): CULT in the last 72 hours. All Micro Results     None            Urinalysis Potassium   Date Value Ref Range Status   03/13/2019 3.7 3.5 - 5.5 mmol/L Final     Creatinine   Date Value Ref Range Status   03/13/2019 1.40 (H) 0.6 - 1.3 MG/DL Final     BUN   Date Value Ref Range Status   03/13/2019 19 (H) 7.0 - 18 MG/DL Final      PSA No results for input(s): PSA in the last 72 hours. Coagulation Lab Results   Component Value Date/Time    INR (POC) 1.0 03/12/2019 07:10 AM           * Disposition: Home    Discharge Medications:   Current Discharge Medication List      START taking these medications    Details   docusate sodium (COLACE) 100 mg capsule Take 1 Cap by mouth two (2) times daily as needed for Constipation for up to 90 days. Qty: 60 Cap, Refills: 2      nitrofurantoin, macrocrystal-monohydrate, (MACROBID) 100 mg capsule Take 1 Cap by mouth two (2) times a day for 7 days. Start 1 week PRIOR to stent removal  Qty: 14 Cap, Refills: 0      tamsulosin (FLOMAX) 0.4 mg capsule Take 1 Cap by mouth daily (after dinner). Qty: 30 Cap, Refills: 3      oxyCODONE-acetaminophen (PERCOCET) 5-325 mg per tablet Take 1-2 Tabs by mouth every four (4) hours as needed for Pain for up to 7 days. Max Daily Amount: 12 Tabs. Qty: 20 Tab, Refills: 0    Associated Diagnoses: Left flank pain; Hydronephrosis with urinary obstruction due to renal calculus; Calculus of left kidney         CONTINUE these medications which have NOT CHANGED    Details   dextroamphetamine-amphetamine (ADDERALL) 20 mg tablet Take 20 mg by mouth two (2) times a day. atorvastatin (LIPITOR) 20 mg tablet Take  by mouth daily.       metFORMIN (GLUCOPHAGE) 500 mg tablet Take  by mouth two (2) times daily (with meals). modafinil (PROVIGIL) 200 mg tablet Take 200 mg by mouth daily. FORTESTA 10 mg/0.5 gram /actuation glpm       gabapentin (NEURONTIN) 300 mg capsule Take 300 mg by mouth three (3) times daily. zolpidem (AMBIEN) 5 mg tablet Take  by mouth nightly as needed. omega-3 fatty acids (FISH OIL) cap Take  by mouth.      lisinopril (PRINIVIL) 20 mg tablet Take  by mouth daily. ezetimibe (ZETIA) 10 mg tablet Take  by mouth. fenofibric acid (TRILIPIX) 135 mg capsule Take 145 mg by mouth daily. levothyroxine (SYNTHROID) 175 mcg tablet Take 175 mcg by mouth Daily (before breakfast). multivitamin (ONE A DAY) tablet Take 1 Tab by mouth daily. cyclobenzaprine (FLEXERIL) 10 mg tablet Take  by mouth three (3) times daily as needed. naproxen (NAPROSYN) 500 mg tablet Take 500 mg by mouth two (2) times daily (with meals). * Follow-up Care/Patient Instructions: Activity: See surgical instructions  Diet: Resume previous diet  Wound Care: Keep wound clean and dry, Reinforce dressing PRN, Ice to area for comfort and As directed  Wound care discussed with patient.      Follow-up Information     Follow up With Specialties Details Why Contact Info    Desirae Angulo MD [address]      William Orlando MD Urology In 2 weeks For Cystoscopy, JJ stent Removal [address] [facility] [address]  [phone]      William Orlando MD Urology In 2 months months with KUB/Ultrasound and to review [address] [place] [address]  [phone]            PCP:  Wisam Hassan MD    Signed:  Alison Dumont NP    [phone]    March 13, 2019 9:31 AM

## 2019-03-13 NOTE — ROUTINE PROCESS
Patient admitted to floor from PACU. He awake and alert. No acute distress noted. Patient oriented to room and how to use the call bell.

## 2019-03-15 LAB
CA PHOS MFR STONE: 10 %
CALCIUM OXALATE DIHYDRATE MFR STONE IR: 25 %
COLOR STONE: NORMAL
COM MFR STONE: 65 %
COMMENT, 120677: NORMAL
COMMENT, 519994: NORMAL
COMPOSITION, 120440: NORMAL
DISCLAIMER, STO32L: NORMAL
NIDUS STONE QL: NORMAL
SIZE STONE: NORMAL MM
WT STONE: 587.3 MG

## 2019-07-10 PROBLEM — M17.11 PRIMARY OSTEOARTHRITIS OF RIGHT KNEE: Status: ACTIVE | Noted: 2017-05-09

## 2019-07-10 PROBLEM — S83.206A ACUTE MENISCAL TEAR OF RIGHT KNEE: Status: ACTIVE | Noted: 2017-05-09

## 2019-07-10 PROBLEM — M70.51 PES ANSERINUS BURSITIS OF RIGHT KNEE: Status: ACTIVE | Noted: 2017-05-09

## 2021-08-03 PROBLEM — N20.0 NEPHROLITHIASIS: Status: RESOLVED | Noted: 2019-03-12 | Resolved: 2021-08-03

## 2023-02-27 NOTE — ROUTINE PROCESS
I have reviewed discharge instructions with the patient and spouse. The patient and spouse verbalized understanding. Discharge medications reviewed with patient and spouse and appropriate educational materials and side effects teaching were provided. Patient armband removed and shredded    Patient abrams was d/c. Patient voided and a bladder scan was completed. Bladder scanned showed 31 mL of retained urine. Patient vitals are stable and patient is cleared for discharge. Niacinamide Pregnancy And Lactation Text: These medications are considered safe during pregnancy.

## (undated) DEVICE — CATH URET 5FRX70CM W/OPN END -- BX/20

## (undated) DEVICE — AIRLIFE™ ADULT CUSHION NASAL CANNULA 14 FOOT (4.3) CRUSH-RESISTANT OXYGEN TUBING, AND U/CONNECT-IT ADAPTER: Brand: AIRLIFE™

## (undated) DEVICE — SHEET, T, LAPAROTOMY, STERILE: Brand: MEDLINE

## (undated) DEVICE — 3M™ BAIR PAWS FLEX™ WARMING GOWN, STANDARD, 20 PER CASE 81003: Brand: BAIR PAWS™

## (undated) DEVICE — SOFT SILICONE HYDROCELLULAR SACRUM DRESSING WITH LOCK AWAY LAYER: Brand: ALLEVYN LIFE SACRUM (LARGE) PACK OF 10

## (undated) DEVICE — SOLUTION IV 1000ML 0.9% SOD CHL

## (undated) DEVICE — GUIDEWIRE: Brand: AMPLATZ SUPER STIFF™

## (undated) DEVICE — 4-PORT MANIFOLD: Brand: NEPTUNE 2

## (undated) DEVICE — DILATOR/SHEATH SET: Brand: 8/10 DILATOR/SHEATH SET

## (undated) DEVICE — LIGHT HANDLE: Brand: DEVON

## (undated) DEVICE — HIGH PRESSURE NEPHROSTOMY BALLOON CATHETER KIT: Brand: NEPHROMAX KIT

## (undated) DEVICE — SUTURE MCRYL SZ 3-0 L27IN ABSRB UD L19MM PS-2 3/8 CIR PRIM Y427H

## (undated) DEVICE — BAG DRAINAGE CUST DISP

## (undated) DEVICE — SINGLE-USE DIGITAL FLEXIBLE URETEROSCOPE: Brand: LITHOVUE

## (undated) DEVICE — LUB SURG MEDC STRL 2OZ TUBE MC -- MEDICHOICE

## (undated) DEVICE — KIT CLN UP BON SECOURS MARYV

## (undated) DEVICE — (D)PREP SKN CHLRAPRP APPL 26ML -- CONVERT TO ITEM 371833

## (undated) DEVICE — ADHESIVE TOP BENZ TINC SWABSTK --

## (undated) DEVICE — KENDALL SCD EXPRESS SLEEVES, KNEE LENGTH, MEDIUM: Brand: KENDALL SCD

## (undated) DEVICE — X-RAY SPONGES,12 PLY: Brand: DERMACEA

## (undated) DEVICE — CATH URETH FOL 2W MED 18FRX5 --

## (undated) DEVICE — SEAL INSTR CYSTO ADJ BX PRT SEAL FOR ACC UP TO 6FR

## (undated) DEVICE — PILLOW POS AD L7IN R FOAM HD REST INTUB SLOT DISP

## (undated) DEVICE — GAUZE SPONGES,16 PLY: Brand: CURITY

## (undated) DEVICE — GUIDEWIRE VASC L180CM DIA0035IN L15CM STR TIP PTFE HEP S STL

## (undated) DEVICE — SYR LR LCK 1ML GRAD NSAF 30ML --

## (undated) DEVICE — DRAPE,REIN 53X77,STERILE: Brand: MEDLINE

## (undated) DEVICE — GDWIRE UROL STR 150CM FLX TP -- BX/5 SENSOR

## (undated) DEVICE — PERCUTANEOUS ACCESS NEEDLE: Brand: NAVIGUIDE

## (undated) DEVICE — TUBING IRRIG L77IN DIA0.241IN L BOR FOR CYSTO W/ NVENT

## (undated) DEVICE — Device

## (undated) DEVICE — SYRINGE MED 20ML STD CLR PLAS LUERLOCK TIP N CTRL DISP

## (undated) DEVICE — DEVICE STBL AD TRICOT ANCHR PD FOR 3 W F CATH STATLOK

## (undated) DEVICE — SWABSTICK MEDICATED ADH PREP BDINE TINC BENZ

## (undated) DEVICE — Device: Brand: MEDEX

## (undated) DEVICE — STERILE POLYISOPRENE POWDER-FREE SURGICAL GLOVES: Brand: PROTEXIS

## (undated) DEVICE — CHECK-FLO ADAPTER: Brand: CHECK-FLO

## (undated) DEVICE — GAUZE SPONGES,USP TYPE VII GAUZE, 12 PLY: Brand: CURITY

## (undated) DEVICE — URETERAL ACCESS SHEATH SET: Brand: NAVIGATOR HD

## (undated) DEVICE — LEGGINGS, PAIR, 31X48, STERILE: Brand: MEDLINE

## (undated) DEVICE — COVER SURG EQUIP W36XL28IN W/ E BND ARND BTM

## (undated) DEVICE — GDWIRE URET STR STD .035X150 -- ZIPWIRE STD

## (undated) DEVICE — SYR 10ML LUER LOK 1/5ML GRAD --

## (undated) DEVICE — INTENDED FOR TISSUE SEPARATION, AND OTHER PROCEDURES THAT REQUIRE A SHARP SURGICAL BLADE TO PUNCTURE OR CUT.: Brand: BARD-PARKER ®  SAFETY SCALPED

## (undated) DEVICE — 3M™ STERI-STRIP™ REINFORCED ADHESIVE SKIN CLOSURES, R1546, 1/4 IN X 4 IN (6 MM X 100 MM), 10 STRIPS/ENVELOPE: Brand: 3M™ STERI-STRIP™

## (undated) DEVICE — SYR 50ML LR LCK 1ML GRAD NSAF --

## (undated) DEVICE — GOWN,REINFORCED,POLY,AURORA,XLARGE,STRL: Brand: MEDLINE

## (undated) DEVICE — Y-TYPE TUR/BLADDER IRRIGATION SET, REGULATING CLAMP

## (undated) DEVICE — GUIDEWIRE URO L150CM DIA0.035IN TAPR 8CM STR TIP STD SHFT

## (undated) DEVICE — DRAPE PERC PROC W335XL196CM LINGEMAN

## (undated) DEVICE — TRAY PREP DRY W/ PREM GLV 2 APPL 6 SPNG 2 UNDPD 1 OVERWRAP

## (undated) DEVICE — SYR IRR CATH TIP LR ADPT 70ML -- CONVERT TO ITEM 363120

## (undated) DEVICE — SOLUTION IRRIG 3000ML 0.9% SOD CHL FLX CONT 0797208] ICU MEDICAL INC]

## (undated) DEVICE — TRAY CATH OD16FR SIL URIN M STATLOK STBL DEV SURSTP

## (undated) DEVICE — SUTURE VCRL SZ 3-0 L27IN ABSRB UD L36MM CT-1 1/2 CIR J258H

## (undated) DEVICE — MEDI-VAC NON-CONDUCTIVE SUCTION TUBING: Brand: CARDINAL HEALTH

## (undated) DEVICE — 3M™ DURAPORE™ SURGICAL TAPE 2 INCHES X 10YARDS (5.0CM X 9.1M) 6ROLLS/CARTON 10CARTONS/CASE 1538-2: Brand: 3M™ DURAPORE™

## (undated) DEVICE — GDWIRE URET STR STD .038X150 -- ZIPWIRE STD

## (undated) DEVICE — GUIDEWIRE VASC L180CM DIA0.038IN L6CM TIP PTFE S STL STR

## (undated) DEVICE — URETERAL ACCESS SHEATH SET: Brand: NAVIGATOR

## (undated) DEVICE — 3M™ TEGADERM™ TRANSPARENT FILM DRESSING FRAME STYLE, 1629, 8 IN X 12 IN (20 CM X 30 CM), 10/CT 8CT/CASE: Brand: 3M™ TEGADERM™

## (undated) DEVICE — CATH URETH FOL 2W SH 18FRX5ML --

## (undated) DEVICE — MAYO STAND COVER: Brand: CONVERTORS

## (undated) DEVICE — TABLE COVER: Brand: CONVERTORS

## (undated) DEVICE — ENDOSCOPIC VALVE WITH ADAPTER.: Brand: SURSEAL® II

## (undated) DEVICE — DUAL LUMEN URETERAL CATHETER

## (undated) DEVICE — DRAPE TWL SURG 16X26IN BLU ORB04] ALLCARE INC]

## (undated) DEVICE — ABDOMINAL PAD: Brand: DERMACEA

## (undated) DEVICE — NITINOL STONE RETRIEVAL BASKET: Brand: ZERO TIP

## (undated) DEVICE — URETRON RIGID PROBE

## (undated) DEVICE — BAG DRAIN URIN 2000ML LF STRL -- CONVERT TO ITEM 363123

## (undated) DEVICE — GDWIRE URET ANG BNTSN .035X150 -- ZIPWIRE BENSTON STD

## (undated) DEVICE — Z DUP USE 2565107 PACK SURG PROC LEG CYSTO T-DRAPE REINF TBL CVR HND TWL